# Patient Record
Sex: FEMALE | Race: OTHER | Employment: OTHER | URBAN - METROPOLITAN AREA
[De-identification: names, ages, dates, MRNs, and addresses within clinical notes are randomized per-mention and may not be internally consistent; named-entity substitution may affect disease eponyms.]

---

## 2017-10-24 NOTE — PROCEDURE NOTE: CLINICAL
PROCEDURE NOTE: Bandage Contact Lens OS. Diagnosis: Corneal Abrasion. Prior to treatment, the risks/benefits/alternatives were discussed. The patient wished to proceed with procedure. A topical anesthetic, proparacaine, was administered. A bandage contact lens was fitted for treatment of ocular surface disease. Night and day contact lens (-0.00, 13.8, 8.6), fitting and placement done without complication. Expiration date: 1108A. Lot #: *. Patient tolerated procedure well. There were no complications. Post procedure instructions given. The patient tolerated the procedure well and left in good condition. Patient is instructed to make sure it is removed in 24 hours, either here, at home, or by another eye doctor. Mary Jhaveri

## 2018-05-10 ENCOUNTER — NEW PATIENT COMPREHENSIVE (OUTPATIENT)
Dept: URBAN - METROPOLITAN AREA CLINIC 43 | Facility: CLINIC | Age: 38
End: 2018-05-10

## 2018-05-10 DIAGNOSIS — H52.03: ICD-10-CM

## 2018-05-10 PROCEDURE — 92004 COMPRE OPH EXAM NEW PT 1/>: CPT

## 2018-05-10 PROCEDURE — 92015 DETERMINE REFRACTIVE STATE: CPT

## 2018-05-10 ASSESSMENT — TONOMETRY
OS_IOP_MMHG: 15
OD_IOP_MMHG: 15

## 2018-05-10 ASSESSMENT — VISUAL ACUITY
OD_SC: 20/20+
OD_SC: J1
OS_SC: J1
OS_SC: 20/20

## 2019-09-18 ENCOUNTER — ESTABLISHED COMPREHENSIVE EXAM (OUTPATIENT)
Dept: URBAN - METROPOLITAN AREA CLINIC 43 | Facility: CLINIC | Age: 39
End: 2019-09-18

## 2019-09-18 DIAGNOSIS — H52.03: ICD-10-CM

## 2019-09-18 PROCEDURE — 92014 COMPRE OPH EXAM EST PT 1/>: CPT

## 2019-09-18 PROCEDURE — 92015 DETERMINE REFRACTIVE STATE: CPT

## 2019-09-18 ASSESSMENT — VISUAL ACUITY
OS_SC: 20/15
OD_SC: 20/15
OD_SC: J1
OS_SC: J1

## 2019-09-18 ASSESSMENT — TONOMETRY
OS_IOP_MMHG: 15
OD_IOP_MMHG: 15

## 2019-09-23 ENCOUNTER — FOLLOW UP (OUTPATIENT)
Dept: URBAN - METROPOLITAN AREA CLINIC 43 | Facility: CLINIC | Age: 39
End: 2019-09-23

## 2019-09-23 DIAGNOSIS — Z79.899: ICD-10-CM

## 2019-09-23 PROCEDURE — 92083 EXTENDED VISUAL FIELD XM: CPT

## 2019-09-23 PROCEDURE — 92012 INTRM OPH EXAM EST PATIENT: CPT

## 2019-09-23 ASSESSMENT — VISUAL ACUITY
OS_SC: J1
OD_SC: J1
OS_SC: 20/20
OD_SC: 20/20

## 2019-09-23 ASSESSMENT — TONOMETRY
OD_IOP_MMHG: 16
OS_IOP_MMHG: 16

## 2020-09-24 ENCOUNTER — ESTABLISHED COMPREHENSIVE EXAM (OUTPATIENT)
Dept: URBAN - METROPOLITAN AREA CLINIC 43 | Facility: CLINIC | Age: 40
End: 2020-09-24

## 2020-09-24 DIAGNOSIS — H52.03: ICD-10-CM

## 2020-09-24 DIAGNOSIS — H52.4: ICD-10-CM

## 2020-09-24 DIAGNOSIS — Z79.899: ICD-10-CM

## 2020-09-24 PROCEDURE — 92014 COMPRE OPH EXAM EST PT 1/>: CPT

## 2020-09-24 PROCEDURE — 92083 EXTENDED VISUAL FIELD XM: CPT

## 2020-09-24 PROCEDURE — 92015 DETERMINE REFRACTIVE STATE: CPT

## 2020-09-24 ASSESSMENT — VISUAL ACUITY
OD_SC: 20/20-1
OS_SC: J1-
OD_SC: J1-
OS_SC: 20/20-1

## 2020-09-24 ASSESSMENT — TONOMETRY
OD_IOP_MMHG: 16
OS_IOP_MMHG: 16

## 2022-04-25 ENCOUNTER — COMPREHENSIVE EXAM (OUTPATIENT)
Dept: URBAN - METROPOLITAN AREA CLINIC 43 | Facility: CLINIC | Age: 42
End: 2022-04-25

## 2022-04-25 DIAGNOSIS — H52.03: ICD-10-CM

## 2022-04-25 DIAGNOSIS — Z79.899: ICD-10-CM

## 2022-04-25 DIAGNOSIS — H52.4: ICD-10-CM

## 2022-04-25 PROCEDURE — 92015 DETERMINE REFRACTIVE STATE: CPT

## 2022-04-25 PROCEDURE — 92083 EXTENDED VISUAL FIELD XM: CPT

## 2022-04-25 PROCEDURE — 92014 COMPRE OPH EXAM EST PT 1/>: CPT

## 2022-04-25 ASSESSMENT — VISUAL ACUITY
OD_SC: 20/20
OS_SC: J2
OS_SC: 20/20
OD_SC: J2
OU_SC: J1

## 2022-04-25 ASSESSMENT — KERATOMETRY
OD_AXISANGLE_DEGREES: 175
OD_K2POWER_DIOPTERS: 43.25
OD_K1POWER_DIOPTERS: 42.25
OS_K1POWER_DIOPTERS: 42.25
OS_AXISANGLE2_DEGREES: 98
OD_AXISANGLE2_DEGREES: 85
OS_AXISANGLE_DEGREES: 8
OS_K2POWER_DIOPTERS: 43.5

## 2022-04-25 ASSESSMENT — TONOMETRY
OS_IOP_MMHG: 16
OD_IOP_MMHG: 16

## 2023-04-26 ENCOUNTER — COMPREHENSIVE EXAM (OUTPATIENT)
Dept: URBAN - METROPOLITAN AREA CLINIC 43 | Facility: CLINIC | Age: 43
End: 2023-04-26

## 2023-04-26 DIAGNOSIS — H52.03: ICD-10-CM

## 2023-04-26 DIAGNOSIS — H52.4: ICD-10-CM

## 2023-04-26 DIAGNOSIS — Z79.899: ICD-10-CM

## 2023-04-26 DIAGNOSIS — H04.123: ICD-10-CM

## 2023-04-26 PROCEDURE — 92015 DETERMINE REFRACTIVE STATE: CPT

## 2023-04-26 PROCEDURE — 92014 COMPRE OPH EXAM EST PT 1/>: CPT

## 2023-04-26 PROCEDURE — 92083 EXTENDED VISUAL FIELD XM: CPT

## 2023-04-26 RX ORDER — NEOMYCIN SULFATE, POLYMYXIN B SULFATE AND DEXAMETHASONE 3.5; 10000; 1 MG/G; [USP'U]/G; MG/G: OINTMENT OPHTHALMIC EVERY EVENING

## 2023-04-26 ASSESSMENT — TONOMETRY
OD_IOP_MMHG: 15
OS_IOP_MMHG: 16

## 2023-04-26 ASSESSMENT — KERATOMETRY
OD_K1POWER_DIOPTERS: 42.25
OD_AXISANGLE_DEGREES: 175
OS_AXISANGLE2_DEGREES: 98
OS_K1POWER_DIOPTERS: 42.25
OS_K2POWER_DIOPTERS: 43.5
OD_K2POWER_DIOPTERS: 43.25
OD_AXISANGLE2_DEGREES: 85
OS_AXISANGLE_DEGREES: 8

## 2023-04-26 ASSESSMENT — VISUAL ACUITY
OS_SC: J2
OU_SC: J1
OD_SC: 20/20
OS_SC: 20/20
OD_SC: J2

## 2025-05-01 ENCOUNTER — COMPREHENSIVE EXAM (OUTPATIENT)
Age: 45
End: 2025-05-01

## 2025-05-01 DIAGNOSIS — Z79.899: ICD-10-CM

## 2025-05-01 DIAGNOSIS — H52.4: ICD-10-CM

## 2025-05-01 PROCEDURE — 92083 EXTENDED VISUAL FIELD XM: CPT

## 2025-05-01 PROCEDURE — 92015 DETERMINE REFRACTIVE STATE: CPT

## 2025-05-01 PROCEDURE — 92014 COMPRE OPH EXAM EST PT 1/>: CPT
